# Patient Record
Sex: FEMALE | Race: WHITE | NOT HISPANIC OR LATINO
[De-identification: names, ages, dates, MRNs, and addresses within clinical notes are randomized per-mention and may not be internally consistent; named-entity substitution may affect disease eponyms.]

---

## 2020-06-13 ENCOUNTER — TRANSCRIPTION ENCOUNTER (OUTPATIENT)
Age: 20
End: 2020-06-13

## 2024-06-03 PROBLEM — Z00.00 ENCOUNTER FOR PREVENTIVE HEALTH EXAMINATION: Status: ACTIVE | Noted: 2024-06-03

## 2024-06-10 ENCOUNTER — APPOINTMENT (OUTPATIENT)
Dept: ORTHOPEDIC SURGERY | Facility: CLINIC | Age: 24
End: 2024-06-10
Payer: COMMERCIAL

## 2024-06-10 VITALS — HEIGHT: 67 IN | WEIGHT: 145 LBS | BODY MASS INDEX: 22.76 KG/M2

## 2024-06-10 DIAGNOSIS — M70.72 OTHER BURSITIS OF HIP, LEFT HIP: ICD-10-CM

## 2024-06-10 DIAGNOSIS — Z78.9 OTHER SPECIFIED HEALTH STATUS: ICD-10-CM

## 2024-06-10 DIAGNOSIS — M76.892 OTHER SPECIFIED ENTHESOPATHIES OF LEFT LOWER LIMB, EXCLUDING FOOT: ICD-10-CM

## 2024-06-10 PROCEDURE — 72200 X-RAY EXAM SI JOINTS: CPT

## 2024-06-10 PROCEDURE — 99203 OFFICE O/P NEW LOW 30 MIN: CPT

## 2024-06-10 RX ORDER — MELOXICAM 15 MG/1
15 TABLET ORAL
Qty: 30 | Refills: 0 | Status: ACTIVE | COMMUNITY
Start: 2024-06-10 | End: 1900-01-01

## 2024-06-10 NOTE — IMAGING
[de-identified] : L hip: - No obvious deformity or swelling - No significant pain directly over coccyx - Pain to ischial tuberosity - No tenderness to palpation of greater trochanter - No pain with full flexion, internal/external rotation - 5/5 strength hip flexion, abduction, adduction - 4/5 strength with hip extension - Negative GETACHEW, FADIR - Negative Log roll - No pain with clamshell - Distally neurovascularly intact   R hip: - No obvious deformity or swelling - No tenderness to palpation of greater trochanter - No pain with full flexion, internal/external rotation - 5/5 strength hip flexion, abduction, adduction - Negative GETACHEW, FADIR - Distally neurovascularly intact    [No bony abnormalities] : No bony abnormalities

## 2024-06-10 NOTE — ASSESSMENT
[FreeTextEntry1] : L sided buttock pain over the last several months with marathon training. Hx of coccyx injury in the past reported. No coccyx pain on exam. L ischial tuberosity pain on exam and pain with hamstring extension proximally - PT referral - Meloxicam once daily as needed - Exercise modifications

## 2024-06-10 NOTE — HISTORY OF PRESENT ILLNESS
[7] : 7 [5] : 5 [Intermittent] : intermittent [Household chores] : household chores [Leisure] : leisure [Nothing helps with pain getting better] : Nothing helps with pain getting better [Sitting] : sitting [Full time] : Work status: full time [de-identified] : 06/10/2024: Pt is a 24yo female presenting with her Mom for evaluation of L lower buttock pain that she thinks may be related to a tailbone injury in 2016 where she was told she chipped her tailbone. She has had ongoing on and off pain in this region since then. Started after a split. Recently she has been marathon training and notices pain to present with running, lower body exercise. Sitting is worse. Improves with stretching some and standing. She has not used nsaids. Denies weakness, paresthesias, change in bladder/bowel.  [] : no [FreeTextEntry6] : out of place  [de-identified] : running

## 2024-06-10 NOTE — PHYSICAL EXAM
[de-identified] : Constitutional: Well developed, well nourished, able to communicate Neuro: Normal sensation, No focal deficits Skin: Intact CV: Peripheral vascular exam grossly normal Pulm: No signs of respiratory distress Psych: Oriented, normal mood and affect

## 2024-07-10 ENCOUNTER — RX RENEWAL (OUTPATIENT)
Age: 24
End: 2024-07-10

## 2024-07-22 ENCOUNTER — APPOINTMENT (OUTPATIENT)
Dept: ORTHOPEDIC SURGERY | Facility: CLINIC | Age: 24
End: 2024-07-22

## 2024-07-29 ENCOUNTER — APPOINTMENT (OUTPATIENT)
Dept: ORTHOPEDIC SURGERY | Facility: CLINIC | Age: 24
End: 2024-07-29
Payer: COMMERCIAL

## 2024-07-29 DIAGNOSIS — M76.892 OTHER SPECIFIED ENTHESOPATHIES OF LEFT LOWER LIMB, EXCLUDING FOOT: ICD-10-CM

## 2024-07-29 DIAGNOSIS — M70.72 OTHER BURSITIS OF HIP, LEFT HIP: ICD-10-CM

## 2024-07-29 PROCEDURE — 99214 OFFICE O/P EST MOD 30 MIN: CPT

## 2024-07-29 NOTE — PHYSICAL EXAM
[de-identified] : Constitutional: Well developed, well nourished, able to communicate Neuro: Normal sensation, No focal deficits Skin: Intact CV: Peripheral vascular exam grossly normal Pulm: No signs of respiratory distress Psych: Oriented, normal mood and affect

## 2024-07-29 NOTE — ASSESSMENT
[FreeTextEntry1] : L sided buttock pain over the last several months with marathon training. Hx of coccyx injury in the past reported. No coccyx pain on exam. L ischial tuberosity pain on exam and pain with hamstring extension proximally. Not improved after 6w of PT and meloxicam - MRI pelvis to evaluate hamstring origin tendinopathy and ischial bursitis  - PT continued - Meloxicam once daily as needed - Acupuncture referral  - Exercise modifications - Follow up after MRI

## 2024-07-29 NOTE — HISTORY OF PRESENT ILLNESS
[7] : 7 [5] : 5 [Intermittent] : intermittent [Household chores] : household chores [Leisure] : leisure [Nothing helps with pain getting better] : Nothing helps with pain getting better [Sitting] : sitting [Full time] : Work status: full time [de-identified] : 06/10/2024: Pt is a 24yo female presenting with her Mom for evaluation of L lower buttock pain that she thinks may be related to a tailbone injury in 2016 where she was told she chipped her tailbone. She has had ongoing on and off pain in this region since then. Started after a split. Recently she has been marathon training and notices pain to present with running, lower body exercise. Sitting is worse. Improves with stretching some and standing. She has not used nsaids. Denies weakness, paresthesias, change in bladder/bowel.   7/29/24: Pt is here today for f/u of L sided buttock pain. Pt states that pain is unchanged from prior. She has been going to PT for 6 weeks and taking meloxicam as needed, with minimal improvement. Has tried running with shorter stride with some improved symptoms.  [] : no [FreeTextEntry6] : out of place  [de-identified] : running

## 2024-07-29 NOTE — IMAGING
[de-identified] : L hip: - No obvious deformity or swelling - No significant pain directly over coccyx - Pain to ischial tuberosity - No tenderness to palpation of greater trochanter - No pain with full flexion, internal/external rotation - 5/5 strength hip flexion, abduction, adduction - 4/5 strength with hip extension - Negative GETACHEW, FADIR - Negative Log roll - No pain with clamshell - Distally neurovascularly intact   R hip: - No obvious deformity or swelling - No tenderness to palpation of greater trochanter - No pain with full flexion, internal/external rotation - 5/5 strength hip flexion, abduction, adduction - Negative GETACHEW, FADIR - Distally neurovascularly intact

## 2024-08-12 ENCOUNTER — APPOINTMENT (OUTPATIENT)
Dept: MRI IMAGING | Facility: CLINIC | Age: 24
End: 2024-08-12
Payer: COMMERCIAL

## 2024-08-12 PROCEDURE — 72195 MRI PELVIS W/O DYE: CPT

## 2024-08-16 ENCOUNTER — APPOINTMENT (OUTPATIENT)
Dept: ORTHOPEDIC SURGERY | Facility: CLINIC | Age: 24
End: 2024-08-16

## 2024-08-16 DIAGNOSIS — M76.892 OTHER SPECIFIED ENTHESOPATHIES OF LEFT LOWER LIMB, EXCLUDING FOOT: ICD-10-CM

## 2024-08-16 PROCEDURE — 99213 OFFICE O/P EST LOW 20 MIN: CPT

## 2024-08-16 NOTE — HISTORY OF PRESENT ILLNESS
[7] : 7 [5] : 5 [Intermittent] : intermittent [Household chores] : household chores [Leisure] : leisure [Nothing helps with pain getting better] : Nothing helps with pain getting better [Sitting] : sitting [Full time] : Work status: full time [de-identified] : 06/10/2024: Pt is a 22yo female presenting with her Mom for evaluation of L lower buttock pain that she thinks may be related to a tailbone injury in 2016 where she was told she chipped her tailbone. She has had ongoing on and off pain in this region since then. Started after a split. Recently she has been marathon training and notices pain to present with running, lower body exercise. Sitting is worse. Improves with stretching some and standing. She has not used nsaids. Denies weakness, paresthesias, change in bladder/bowel.   7/29/24: Pt is here today for f/u of L sided buttock pain. Pt states that pain is unchanged from prior. She has been going to PT for 6 weeks and taking meloxicam as needed, with minimal improvement. Has tried running with shorter stride with some improved symptoms.   08/16/2024: Here today for follow-up telehealth exam.  States she has been continuing to go to physical therapy and therapy is now focusing more on her proximal hamstring as opposed to her coccyx.  She has been finding ways to run still without exacerbating her symptoms.  She denies new injuries  [] : no [FreeTextEntry6] : out of place  [de-identified] : running

## 2024-08-16 NOTE — PHYSICAL EXAM
[de-identified] : Constitutional: Well developed, well nourished, able to communicate Neuro: Normal sensation, No focal deficits Skin: Intact CV: Peripheral vascular exam grossly normal Pulm: No signs of respiratory distress Psych: Oriented, normal mood and affect

## 2024-08-16 NOTE — ASSESSMENT
[FreeTextEntry1] : L sided buttock pain over the last several months with marathon training. Hx of coccyx injury in the past reported. No coccyx pain on exam. L ischial tuberosity pain on exam and pain with hamstring extension proximally. Not improved after 6w of PT and meloxicam.  MRI reviewed without concerning findings.  On personal review, mild signs of proximal hamstring tendinopathy - Continue physical therapy - Meloxicam once daily as needed - Acupuncture referral  - Exercise modifications -Discussed possibility of diagnostic anesthetic injection versus PRP injection in the future if symptoms do not improve - Follow-up in 6 to 8 weeks if needed

## 2024-09-02 ENCOUNTER — RX RENEWAL (OUTPATIENT)
Age: 24
End: 2024-09-02

## 2024-09-13 ENCOUNTER — NON-APPOINTMENT (OUTPATIENT)
Age: 24
End: 2024-09-13

## 2024-09-13 ENCOUNTER — APPOINTMENT (OUTPATIENT)
Dept: ORTHOPEDIC SURGERY | Facility: CLINIC | Age: 24
End: 2024-09-13

## 2024-09-13 VITALS — HEIGHT: 67 IN | WEIGHT: 145 LBS | BODY MASS INDEX: 22.76 KG/M2

## 2024-09-13 DIAGNOSIS — S82.64XA NONDISPLACED FRACTURE OF LATERAL MALLEOLUS OF RIGHT FIBULA, INITIAL ENCOUNTER FOR CLOSED FRACTURE: ICD-10-CM

## 2024-09-13 PROCEDURE — 73590 X-RAY EXAM OF LOWER LEG: CPT | Mod: RT

## 2024-09-13 PROCEDURE — 73630 X-RAY EXAM OF FOOT: CPT | Mod: RT

## 2024-09-13 PROCEDURE — 27786 TREATMENT OF ANKLE FRACTURE: CPT

## 2024-09-13 PROCEDURE — 99204 OFFICE O/P NEW MOD 45 MIN: CPT

## 2024-09-13 NOTE — HISTORY OF PRESENT ILLNESS
[6] : 6 [8] : 8 [Constant] : constant [Household chores] : household chores [Leisure] : leisure [Sleep] : sleep [Ice] : ice [de-identified] : Injured right ankle playing kickball last night. Went to urgent care for xrays, referred to office [] : no [FreeTextEntry1] : Right fibula [FreeTextEntry5] : Was playing kickball, fell on her foot, occurred 9/12/24. Went to [facility] [place] and underwent x-rays, was told she had a fx. Ambulating with surgical brace and boot

## 2024-09-13 NOTE — ASSESSMENT
[FreeTextEntry1] : Needs CAM boot, has crutches, can maintain NWB in CAM Ice and elevate Advil for pain Baby ASA for DVT prophylaxis

## 2024-09-13 NOTE — PHYSICAL EXAM
[Right] : right foot and ankle [Moderate] : moderate swelling of lateral ankle [] : no achilles tendon tenderness

## 2024-09-13 NOTE — IMAGING
[Outside films reviewed] : Outside films reviewed [Lateral malleolus fracture] : Lateral malleolus fracture [Right] : right foot [There are no fractures, subluxations or dislocations. No significant abnormalities are seen] : There are no fractures, subluxations or dislocations. No significant abnormalities are seen [FreeTextEntry9] : non displaced lateral mal fx, talus maintained in mortise

## 2024-09-27 ENCOUNTER — NON-APPOINTMENT (OUTPATIENT)
Age: 24
End: 2024-09-27

## 2024-09-27 ENCOUNTER — APPOINTMENT (OUTPATIENT)
Dept: ORTHOPEDIC SURGERY | Facility: CLINIC | Age: 24
End: 2024-09-27
Payer: COMMERCIAL

## 2024-09-27 DIAGNOSIS — S82.64XA NONDISPLACED FRACTURE OF LATERAL MALLEOLUS OF RIGHT FIBULA, INITIAL ENCOUNTER FOR CLOSED FRACTURE: ICD-10-CM

## 2024-09-27 PROCEDURE — 73610 X-RAY EXAM OF ANKLE: CPT | Mod: RT

## 2024-09-27 PROCEDURE — 99024 POSTOP FOLLOW-UP VISIT: CPT

## 2024-09-27 NOTE — ASSESSMENT
[FreeTextEntry1] : Continue with CAM boot, crutches, can put partial pressure with CAM Continue to elevate at the end of the day Advil for pain Baby ASA for DVT prophylaxis for another couple weeks remote work only

## 2024-09-27 NOTE — HISTORY OF PRESENT ILLNESS
[6] : 6 [8] : 8 [Constant] : constant [Household chores] : household chores [Leisure] : leisure [Sleep] : sleep [Ice] : ice [de-identified] : 9/27/24: here to follow up on right fibula fracture. reports some improvement in pain. NWB with boot and crutches.  Injured right ankle playing kickball last night. Went to urgent care for xrays, referred to office [] : no [FreeTextEntry1] : Right fibula [FreeTextEntry5] : Was playing kickball, fell on her foot, occurred 9/12/24. Went to [facility] and underwent x-rays, was told she had a fx. Ambulating with surgical brace and boot

## 2024-09-27 NOTE — IMAGING
[Lateral malleolus fracture] : Lateral malleolus fracture [Right] : right foot [There are no fractures, subluxations or dislocations. No significant abnormalities are seen] : There are no fractures, subluxations or dislocations. No significant abnormalities are seen [FreeTextEntry9] : non displaced lateral mal fx, talus maintained in mortise

## 2024-10-08 ENCOUNTER — APPOINTMENT (OUTPATIENT)
Dept: ORTHOPEDIC SURGERY | Facility: CLINIC | Age: 24
End: 2024-10-08
Payer: COMMERCIAL

## 2024-10-08 DIAGNOSIS — S82.64XA NONDISPLACED FRACTURE OF LATERAL MALLEOLUS OF RIGHT FIBULA, INITIAL ENCOUNTER FOR CLOSED FRACTURE: ICD-10-CM

## 2024-10-08 DIAGNOSIS — S82.444D: ICD-10-CM

## 2024-10-08 PROCEDURE — 73600 X-RAY EXAM OF ANKLE: CPT | Mod: RT

## 2024-10-08 PROCEDURE — 73630 X-RAY EXAM OF FOOT: CPT | Mod: RT

## 2024-10-08 PROCEDURE — 99024 POSTOP FOLLOW-UP VISIT: CPT

## 2024-10-11 ENCOUNTER — APPOINTMENT (OUTPATIENT)
Dept: ORTHOPEDIC SURGERY | Facility: CLINIC | Age: 24
End: 2024-10-11

## 2024-10-29 ENCOUNTER — APPOINTMENT (OUTPATIENT)
Dept: ORTHOPEDIC SURGERY | Facility: CLINIC | Age: 24
End: 2024-10-29
Payer: COMMERCIAL

## 2024-10-29 VITALS — HEIGHT: 67 IN | BODY MASS INDEX: 22.76 KG/M2 | WEIGHT: 145 LBS

## 2024-10-29 DIAGNOSIS — S82.444D: ICD-10-CM

## 2024-10-29 PROCEDURE — 73610 X-RAY EXAM OF ANKLE: CPT | Mod: RT

## 2024-10-29 PROCEDURE — 99024 POSTOP FOLLOW-UP VISIT: CPT
